# Patient Record
Sex: MALE | Race: OTHER | NOT HISPANIC OR LATINO | URBAN - METROPOLITAN AREA
[De-identification: names, ages, dates, MRNs, and addresses within clinical notes are randomized per-mention and may not be internally consistent; named-entity substitution may affect disease eponyms.]

---

## 2020-11-22 ENCOUNTER — EMERGENCY (EMERGENCY)
Facility: HOSPITAL | Age: 30
LOS: 1 days | Discharge: HOME | End: 2020-11-22
Attending: EMERGENCY MEDICINE | Admitting: EMERGENCY MEDICINE
Payer: COMMERCIAL

## 2020-11-22 VITALS
RESPIRATION RATE: 18 BRPM | SYSTOLIC BLOOD PRESSURE: 122 MMHG | DIASTOLIC BLOOD PRESSURE: 80 MMHG | TEMPERATURE: 98 F | OXYGEN SATURATION: 99 % | HEART RATE: 76 BPM | WEIGHT: 154.32 LBS | HEIGHT: 64.96 IN
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
## 2020-11-22 DIAGNOSIS — S43.002A UNSPECIFIED SUBLUXATION OF LEFT SHOULDER JOINT, INITIAL ENCOUNTER: ICD-10-CM
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
## 2020-11-22 PROCEDURE — 23650 CLTX SHO DSLC W/MNPJ WO ANES: CPT | Mod: 54,LT

## 2020-11-22 PROCEDURE — 73030 X-RAY EXAM OF SHOULDER: CPT | Mod: 26,LT

## 2020-11-22 PROCEDURE — 99284 EMERGENCY DEPT VISIT MOD MDM: CPT | Mod: 25,57

## 2020-11-22 RX ORDER — HYDROMORPHONE HYDROCHLORIDE 2 MG/ML
1 INJECTION INTRAMUSCULAR; INTRAVENOUS; SUBCUTANEOUS ONCE
Refills: 0 | Status: DISCONTINUED | OUTPATIENT
Start: 2020-11-22 | End: 2020-11-22

## 2020-11-22 RX ADMIN — HYDROMORPHONE HYDROCHLORIDE 1 MILLIGRAM(S): 2 INJECTION INTRAMUSCULAR; INTRAVENOUS; SUBCUTANEOUS at 23:14

## 2020-11-22 RX ADMIN — PROPOFOL 100 MILLIGRAM(S): 10 INJECTION, EMULSION INTRAVENOUS at 23:56

## 2020-11-22 NOTE — ED ADULT NURSE NOTE - NSIMPLEMENTINTERV_GEN_ALL_ED
Implemented All Universal Safety Interventions:  [place] to call system. Call bell, personal items and telephone within reach. Instruct patient to call for assistance. Room bathroom lighting operational. Non-slip footwear when patient is off stretcher. Physically safe environment: no spills, clutter or unnecessary equipment. Stretcher in lowest position, wheels locked, appropriate side rails in place.

## 2020-11-22 NOTE — ED ADULT NURSE NOTE - OBJECTIVE STATEMENT
Pt. with no pmhx, pshx of right shoulder surgery presents with left shoulder pain while playing soccer tonight. Pt. states he "made a bad move" and immediately felt pain on his left shoulder. Pt. states he has dislocated both shoulders in the past. Left shoulder deformity noted. Pt. guarding and not willing to move left arm. No bleeding or ecchymosis noted to left shoulder. Pain meds administered.

## 2020-11-22 NOTE — ED ADULT TRIAGE NOTE - CHIEF COMPLAINT QUOTE
left shoulder dislocation while playing soccer "It was just a bad move." denies any head injury/fall. +deformity, limited rom. hx of bilateral shoulder dislocations.

## 2020-11-23 VITALS
DIASTOLIC BLOOD PRESSURE: 73 MMHG | RESPIRATION RATE: 18 BRPM | OXYGEN SATURATION: 100 % | SYSTOLIC BLOOD PRESSURE: 118 MMHG | HEART RATE: 71 BPM

## 2020-11-23 PROCEDURE — 73030 X-RAY EXAM OF SHOULDER: CPT | Mod: 26,LT

## 2020-11-23 RX ORDER — PROPOFOL 10 MG/ML
100 INJECTION, EMULSION INTRAVENOUS ONCE
Refills: 0 | Status: COMPLETED | OUTPATIENT
Start: 2020-11-23 | End: 2020-11-22

## 2020-11-23 NOTE — ED PROVIDER NOTE - PATIENT PORTAL LINK FT
You can access the FollowMyHealth Patient Portal offered by [facility] by registering at the following website: [email or web address]. By joining [employer]’s FollowMyHealth portal, you will also be able to view your health information using other applications (apps) compatible with our system.

## 2020-11-23 NOTE — ED PROVIDER NOTE - CLINICAL SUMMARY MEDICAL DECISION MAKING FREE TEXT BOX
L shoulder dislocation.  Pain control given.  Pt unable to cooperate with reduction 2/2 pain.  Will perform procedural sedation with propofol for reduction.  Aided by Dr. Cavazos.

## 2020-11-23 NOTE — ED PROVIDER NOTE - PROGRESS NOTE DETAILS
Pt reports pain totally resolved.  Post -reduction xrays discussed.  Pt placed in sling.  Discharged into the custody of his wife.  Discussed need for orthopedic f/u.

## 2020-11-23 NOTE — ED PROVIDER NOTE - OBJECTIVE STATEMENT
Pt is a 29yo M with no PMH who p/w L shoulder pain.  Pt reports a history of L shoulder dislocations and it feels the same.  Pain is sharp and severe, rated 10/10.  No radiation of pain.  Pain exacerbated with any movement of the joint.  Denies any distal numbness/weakness.  Denies any specific trauma.  Reports it came out while playing soccer and swung his arm in a bizarre nature.  no fall or trauma.   Last meal (solid and liquid about 5 hours pta.

## 2020-11-23 NOTE — ED PROVIDER NOTE - PHYSICAL EXAMINATION
VITAL SIGNS: I have reviewed nursing notes and confirm.  CONSTITUTIONAL: Well-developed; well-nourished; in no acute distress.  SKIN: Skin is warm and dry, no acute rash.  HEAD: Normocephalic; atraumatic.  EYES: PERRL, EOM intact; conjunctiva and sclera clear.  ENT: No nasal discharge; airway clear with fully visible uvula.   NECK: Supple; non tender.  CARD: S1, S2 normal; no murmurs, gallops, or rubs. Regular rate and rhythm.  RESP: No wheezes, rales or rhonchi.  ABD: Normal bowel sounds; soft; non-distended; non-tender; no hepatosplenomegaly.  MSK: L shoulder - held abducted and internall rotated with concavity over joint c/w dislocation.  +Sensation over L deltoid.  NVID with +2 pulses.   NEURO: Alert, oriented. Grossly unremarkable.  PSYCH: Cooperative, appropriate.

## 2020-11-23 NOTE — ED PROVIDER NOTE - CARE PROVIDER_API CALL
Jose Juan Spears)  Orthopaedic Surgery  [address], [address] [place] [address]  Phone: [phone]  Fax: [phone]  Follow Up Time: 1-3 Days

## 2020-11-23 NOTE — ED PROVIDER NOTE - NSFOLLOWUPINSTRUCTIONS_ED_ALL_ED_FT
-PLEASE FOLLOW-UP WITH AN ORTHOPEDIC DOCTOR IN 1-2 DAYS.  BRING ALL PAPERWORK FROM TODAY'S VISIT TO YOUR FOLLOW-UP VISIT.  YOU MAY ALSO CALL [phone] AND ASK FOR MS. SALONI PRUETT.  SHE CAN HELP YOU MAKE A FOLLOW-UP APPOINTMENT.  -TAKE OVER THE COUNTER IBUPROFEN 400-600MG BY MOUTH EVERY 8 HOURS AS NEEDED FOR PAIN.  BE SURE TO TAKE WITH FOOD OR MILK AS THIS MEDICATION CAN CAUSE STOMACH IRRITATION.  -PLEASE RETURN TO THE ER IMMEDIATELY OR CALL 911 FOR ANY HIGH FEVER, TROUBLE BREATHING, VOMITING, SEVERE PAIN, OR ANY OTHER CONCERNS.  -BE SURE TO WEAR THE SLING GIVEN TO YOU TODAY UNTIL YOU FOLLOW UP WITH THE ORTHOPEDIC DOCTOR.

## 2021-04-06 ENCOUNTER — APPOINTMENT (OUTPATIENT)
Age: 31
End: 2021-04-06
Payer: COMMERCIAL

## 2021-04-06 PROCEDURE — 0001A: CPT

## 2021-04-27 ENCOUNTER — APPOINTMENT (OUTPATIENT)
Age: 31
End: 2021-04-27
Payer: COMMERCIAL

## 2021-04-27 PROCEDURE — 0002A: CPT

## 2022-02-23 ENCOUNTER — EMERGENCY (EMERGENCY)
Facility: HOSPITAL | Age: 32
LOS: 1 days | Discharge: HOME | End: 2022-02-23
Attending: EMERGENCY MEDICINE | Admitting: EMERGENCY MEDICINE
Payer: COMMERCIAL

## 2022-02-23 VITALS
DIASTOLIC BLOOD PRESSURE: 88 MMHG | SYSTOLIC BLOOD PRESSURE: 149 MMHG | HEIGHT: 64.96 IN | TEMPERATURE: 98 F | WEIGHT: 153 LBS | OXYGEN SATURATION: 98 % | HEART RATE: 80 BPM | RESPIRATION RATE: 18 BRPM

## 2022-02-23 DIAGNOSIS — S43.004A UNSPECIFIED DISLOCATION OF RIGHT SHOULDER JOINT, INITIAL ENCOUNTER: ICD-10-CM
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
## 2022-02-23 DIAGNOSIS — M25.511 PAIN IN RIGHT SHOULDER: ICD-10-CM
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
## 2022-02-23 PROCEDURE — 99284 EMERGENCY DEPT VISIT MOD MDM: CPT | Mod: 25,57

## 2022-02-23 PROCEDURE — 99156 MOD SED OTH PHYS/QHP 5/>YRS: CPT

## 2022-02-23 PROCEDURE — 73030 X-RAY EXAM OF SHOULDER: CPT | Mod: 26,RT

## 2022-02-23 RX ORDER — KETOROLAC TROMETHAMINE 30 MG/ML
15 SYRINGE (ML) INJECTION ONCE
Refills: 0 | Status: DISCONTINUED | OUTPATIENT
Start: 2022-02-23 | End: 2022-02-23

## 2022-02-23 RX ORDER — MORPHINE SULFATE 50 MG/1
6 CAPSULE, EXTENDED RELEASE ORAL ONCE
Refills: 0 | Status: DISCONTINUED | OUTPATIENT
Start: 2022-02-23 | End: 2022-02-23

## 2022-02-23 RX ORDER — HYDROMORPHONE HYDROCHLORIDE 2 MG/ML
0.5 INJECTION INTRAMUSCULAR; INTRAVENOUS; SUBCUTANEOUS ONCE
Refills: 0 | Status: DISCONTINUED | OUTPATIENT
Start: 2022-02-23 | End: 2022-02-23

## 2022-02-23 RX ADMIN — HYDROMORPHONE HYDROCHLORIDE 0.5 MILLIGRAM(S): 2 INJECTION INTRAMUSCULAR; INTRAVENOUS; SUBCUTANEOUS at 23:06

## 2022-02-23 RX ADMIN — Medication 15 MILLIGRAM(S): at 22:42

## 2022-02-23 RX ADMIN — PROPOFOL 45 MILLIGRAM(S): 10 INJECTION, EMULSION INTRAVENOUS at 23:56

## 2022-02-23 RX ADMIN — MORPHINE SULFATE 6 MILLIGRAM(S): 50 CAPSULE, EXTENDED RELEASE ORAL at 22:41

## 2022-02-23 NOTE — ED ADULT TRIAGE NOTE - CHIEF COMPLAINT QUOTE
Pt walked into ER c/o right shoulder dislocation while playing soccer beti 30 PTA. Noted deformity with limited ROM to right shoulder. Pt denies further at this time.

## 2022-02-24 VITALS
DIASTOLIC BLOOD PRESSURE: 65 MMHG | SYSTOLIC BLOOD PRESSURE: 143 MMHG | OXYGEN SATURATION: 100 % | HEART RATE: 69 BPM | RESPIRATION RATE: 18 BRPM

## 2022-02-24 PROCEDURE — 73030 X-RAY EXAM OF SHOULDER: CPT | Mod: 26,RT

## 2022-02-24 RX ORDER — PROPOFOL 10 MG/ML
45 INJECTION, EMULSION INTRAVENOUS ONCE
Refills: 0 | Status: COMPLETED | OUTPATIENT
Start: 2022-02-24 | End: 2022-02-24

## 2022-02-24 RX ORDER — PROPOFOL 10 MG/ML
45 INJECTION, EMULSION INTRAVENOUS ONCE
Refills: 0 | Status: COMPLETED | OUTPATIENT
Start: 2022-02-24 | End: 2022-02-23

## 2022-02-24 RX ORDER — PROPOFOL 10 MG/ML
80 INJECTION, EMULSION INTRAVENOUS ONCE
Refills: 0 | Status: COMPLETED | OUTPATIENT
Start: 2022-02-24 | End: 2022-02-24

## 2022-02-24 RX ADMIN — PROPOFOL 80 MILLIGRAM(S): 10 INJECTION, EMULSION INTRAVENOUS at 00:07

## 2022-02-24 RX ADMIN — PROPOFOL 45 MILLIGRAM(S): 10 INJECTION, EMULSION INTRAVENOUS at 00:00

## 2022-02-24 NOTE — ED PROCEDURE NOTE - ATTENDING CONTRIBUTION TO CARE
patient presents with R shoulder dislocation, NV intact, present during exam and procedure, no post proc deficits, repeat films wnl. stable for discharge.

## 2022-02-24 NOTE — ED PROVIDER NOTE - NS ED ROS FT
Review of Systems    Constitutional: (-) fever  Eyes/ENT: (-) change in vision  Cardiovascular: (-) chest pain, (-) palpitation   Respiratory: (-) cough, (-) shortness of breath  Gastrointestinal: (-) abdominal pain (-) vomiting, (-) diarrhea  Musculoskeletal: (-) neck pain, (-) back pain  Integumentary: (-) rash, (-) edema  Neurological: (-) headache, (-) altered mental status  Heme/Lymph: (-) easy bruising (-) easy bleeding   Allergic/Immunologic: (-) pruritus

## 2022-02-24 NOTE — ED PROVIDER NOTE - PATIENT PORTAL LINK FT
You can access the FollowMyHealth Patient Portal offered by [facility] by registering at the following website: [email or web address]. By joining [facility]’s FollowMyHealth portal, you will also be able to view your health information using other applications (apps) compatible with our system.

## 2022-02-24 NOTE — ED ADULT NURSE REASSESSMENT NOTE - NS ED NURSE REASSESS COMMENT FT1
Received Pt from previous RN sitting up on stretcher awake and alert, breathing without issue on RA. IV site is patent. MD and SANDER at the bedside for right shoulder reduction. Consent obtained by MD. SATISH at the bedside.
Pt returned from radiology and is awaiting DC, SO at the bedside.

## 2022-02-24 NOTE — ED PROVIDER NOTE - PHYSICAL EXAMINATION
Physical Exam    Vital Signs: I have reviewed the initial vital signs.  Constitutional: well-nourished, appears stated age, no acute distress  Eyes: PERRLA, and symmetrical lids.  ENT: Neck supple with no adenopathy, moist MM.  Cardiovascular: regular rate, regular rhythm, well-perfused extremities  Respiratory: unlabored respiratory effort, clear to auscultation bilaterally  Gastrointestinal: soft, non-tender abdomen, no pulsatile mass  Musculoskeletal: +R shoulder step off with empty glenoid fosa and ttp along the R shoulder   Integumentary: warm, dry, no rash  Neurologic: extremities’ motor and sensory functions grossly intact in axially, median, ulnar, and radial n   Psychiatric: A&Ox3

## 2022-02-24 NOTE — ED PROCEDURE NOTE - NS_POSTPROCCAREGUIDE_ED_ALL_ED
Patient is now fully awake, with vital signs and temperature stable, hydration is adequate, patients Gema’s  score is at baseline (or greater than 8), patient and escort has received  discharge education.

## 2022-02-24 NOTE — ED PROVIDER NOTE - PROGRESS NOTE DETAILS
Attempted to reduce shoulder with IV Dilaudid and IV lidocaine pt refusing and states pain is too much requesting conscious sedation

## 2022-02-24 NOTE — ED PROVIDER NOTE - CARE PROVIDER_API CALL
Nahid Garcia)  Orthopaedic Surgery; Sports Medicine  [address] [place] [address]  Phone: [phone]  Fax: [phone]  Follow Up Time: 1-3 Days

## 2022-02-24 NOTE — ED PROVIDER NOTE - OBJECTIVE STATEMENT
30 yo m pmhx sig for multiple shoulder dislocations pw acute onset of R shoulder pain and dislocation while playing soccer with severe R shoulder pain aching mod in severity non radiating worse with shoulder movement and improves with rest, no weakness, no numbness, no paresthesias. No head or neck trauma.    I have reviewed available current nursing and previous documentation of past medical, surgical, family, and/or social history.

## 2022-02-24 NOTE — ED PROVIDER NOTE - ATTENDING CONTRIBUTION TO CARE
patient seen and examined at bedside, nv intact with r shoulder dislocation, requiring reduction and conscious sedation, consented, no acute issues, dc with follow up. agree with mangement and documenation.

## 2022-02-24 NOTE — ED PROVIDER NOTE - CLINICAL SUMMARY MEDICAL DECISION MAKING FREE TEXT BOX
30 yo m pmhx sig for multiple shoulder dislocations pw acute onset of R shoulder pain and dislocation while playing soccer with severe R shoulder pain aching mod in severity non radiating worse with shoulder movement and improves with rest, no weakness, no numbness, no paresthesias. No head or neck trauma. +R shoulder deformity with dislocation neurovascular intact.

## 2022-04-15 ENCOUNTER — LABORATORY RESULT (OUTPATIENT)
Age: 32
End: 2022-04-15

## 2022-04-18 PROBLEM — Z00.00 ENCOUNTER FOR PREVENTIVE HEALTH EXAMINATION: Status: ACTIVE | Noted: 2022-04-18

## 2024-11-29 NOTE — ED ADULT NURSE NOTE - NSFALLRSKASSESSTYPE_ED_ALL_ED
Subjective   Patient ID: Christopher Gould is a 22 y.o. female who presents for New Patient Visit (Ears are painful/Can't hear having a hard time/).  HPI  This patient is referred for evaluation of a sensation of clogged ears.  The patient is  accompanied by her boyfriend.   When asked about ear pain, hearing loss, itching, discharge from ear, tinnitus, aural fullness or autophony, the patient admits to bilateral otalgia, hearing loss, fullness.  Symptoms began approximately 2 days ago.  She was trying to clean her right ear canal with foreign body and developed hearing loss.  She did not have otalgia at that time.  She then went to her PCP who was assistant attempted to clean the ear with instruments and lavage.  Patient reports that she left with severe pain on both sides.  She was then evaluated in the ED and started on eardrops.   The patient does not wear a hearing aid.  When asked about a significant past otological history including history of prior ear surgery, noise exposure, exposure to ototoxic drugs or agents, and/or family history of hearing loss, the patient admits to none.    Review of Systems  A comprehensive or 10 points review of the patient's constitutional, neurological, HEENT, pulmonary, cardiovascular and genito-urinary systems showed only those mentioned in history of present illness.    Objective   Physical Exam  Constitutional: no fever, chills, weight loss or weight gain   General appearance: Appears well, well-nourished, well groomed. No acute distress.   Communication: Normal communication   Psychiatric: Oriented to person, place and time. Normal mood and affect.   Neurologic: Cranial nerves II-XII grossly intact and symmetric bilaterally.   Head and Face:   Head: Atraumatic with no masses, lesions or scarring.   Face: Normal symmetry, no paralysis, synkinesis or facial tic. No scars or deformities.     Eyes: Conjunctiva not edematous or erythematous   Ears: External inspection of ears with  no deformity, scars or masses.  Bilateral canals with cerumen impactions     Neck: Normal appearing, symmetric, trachea midline.   Cardiovascular: Examination of peripheral vascular system shows no clubbing or cyanosis.   Respiratory: No respiratory distress increased work of breathing. Inspection of the chest with symmetric chest expansion and normal respiratory effort.   Skin: No rashes in the head or neck    Assessment/Plan     This patient presents for initial evaluation of acute acquired bilateral cerumen impaction, bilateral otalgia, bilateral aural fullness, bilateral hearing difficulty.    Reassurance given that otologic exam is essentially normal after cleaning.  Both ear canals are slightly inflamed due to the impactions but there is no obvious infection.  Both TMs are intact, but there is a small amount of cerumen on the left side adherent to the anterior canal wall/TM.  Due to patient comfort, this was left in place.  She does have follow-up with a local ENT next week.  I recommended that she use oil-based drops at bedtime for at least 3 days prior to that visit.  She may follow-up with me as needed.  All questions were answered to patient's satisfaction.    This note was created using speech recognition transcription software. Despite proofreading, several typographical errors might be present that might affect the meaning of the content. Please call with any questions.  Patient ID: Christopher Gould is a 22 y.o. female.    Ear cerumen removal    Date/Time: 11/29/2024 1:59 PM    Performed by: DYLON Young  Authorized by: DYLON Young    Consent:     Consent obtained:  Verbal    Consent given by:  Patient    Risks discussed:  Pain    Alternatives discussed:  No treatment  Procedure details:     Location:  L ear and R ear    Procedure type comment:  Suction, right angle hook, alligator    Procedure outcomes: cerumen removed    Post-procedure details:     Inspection:  No bleeding,  some cerumen remaining and TM intact    Hearing quality:  Improved    Procedure completion:  Tolerated with difficulty  Comments:      Due to patient comfort, several breaks were taken during cleaning as well as baby oil instilled into both ear canals.  Tremendous amount of cerumen removed bilaterally.         Trupti Nassar, APRN-CNP 11/29/24 1:55 PM    Initial (On Arrival)